# Patient Record
Sex: FEMALE | Race: WHITE | URBAN - METROPOLITAN AREA
[De-identification: names, ages, dates, MRNs, and addresses within clinical notes are randomized per-mention and may not be internally consistent; named-entity substitution may affect disease eponyms.]

---

## 2017-06-28 ENCOUNTER — OFFICE VISIT (OUTPATIENT)
Dept: ORTHOPEDIC SURGERY | Age: 32
End: 2017-06-28

## 2017-06-28 DIAGNOSIS — M54.12 CERVICAL NEURITIS: ICD-10-CM

## 2017-06-28 DIAGNOSIS — M54.2 CERVICALGIA: Primary | ICD-10-CM

## 2017-06-28 DIAGNOSIS — S16.1XXA CERVICAL STRAIN, INITIAL ENCOUNTER: ICD-10-CM

## 2017-06-28 RX ORDER — TRAMADOL HYDROCHLORIDE 50 MG/1
TABLET ORAL
Refills: 0 | COMMUNITY
Start: 2017-06-14

## 2017-06-28 RX ORDER — HYDROCODONE BITARTRATE AND ACETAMINOPHEN 5; 325 MG/1; MG/1
TABLET ORAL
Refills: 0 | COMMUNITY
Start: 2017-06-17

## 2017-06-28 RX ORDER — METHOCARBAMOL 750 MG/1
TABLET, FILM COATED ORAL
Refills: 0 | COMMUNITY
Start: 2017-06-14

## 2017-06-28 RX ORDER — IBUPROFEN 800 MG/1
TABLET ORAL
Qty: 90 TAB | Refills: 0 | Status: SHIPPED | OUTPATIENT
Start: 2017-06-28

## 2017-06-28 RX ORDER — CYCLOBENZAPRINE HCL 10 MG
TABLET ORAL
Refills: 0 | COMMUNITY
Start: 2017-06-17

## 2017-06-28 RX ORDER — BISMUTH SUBSALICYLATE 262 MG
1 TABLET,CHEWABLE ORAL DAILY
COMMUNITY

## 2017-06-28 RX ORDER — LEVOCETIRIZINE DIHYDROCHLORIDE 5 MG/1
TABLET, FILM COATED ORAL
Refills: 0 | COMMUNITY
Start: 2017-04-13

## 2017-06-28 RX ORDER — GABAPENTIN 300 MG/1
300 CAPSULE ORAL
Qty: 90 CAP | Refills: 1 | Status: SHIPPED | OUTPATIENT
Start: 2017-06-28

## 2017-06-28 NOTE — MR AVS SNAPSHOT
Visit Information Date & Time Provider Department Dept. Phone Encounter #  
 6/28/2017  1:20 PM Ysabel Saeed MD [facility] [place] [phone] Follow-up Instructions Return in about 4 weeks (around 7/26/2017). Upcoming Health Maintenance Date Due DTaP/Tdap/Td series (1 - Tdap) 3/25/2006 PAP AKA CERVICAL CYTOLOGY 3/25/2006 INFLUENZA AGE 9 TO ADULT 8/1/2017 Allergies as of 6/28/2017  Review Complete On: 6/28/2017 By: Ysabel Saeed MD  
  
 Severity Noted Reaction Type Reactions Doxycycline High 06/28/2017   Systemic Swelling Effexor [Venlafaxine] Medium 06/28/2017    Other (comments) Burning from the inside out/shaking. Prednisone  06/28/2017    Palpitations \"Adverse reaction\" Current Immunizations  Never Reviewed No immunizations on file. Not reviewed this visit You Were Diagnosed With   
  
 Codes Comments Cervicalgia    -  Primary ICD-10-CM: M54.2 ICD-9-CM: 723.1 Cervical neuritis     ICD-10-CM: M54.12 
ICD-9-CM: 723.4 Cervical strain, initial encounter     ICD-10-CM: S16. Satnam Colace ICD-9-CM: 910. 0 Vitals Smoking Status Current Every Day Smoker Preferred Pharmacy Pharmacy Name Phone [id number] [address] Your Updated Medication List  
  
   
This list is accurate as of: 6/28/17  2:09 PM.  Always use your most recent med list.  
  
  
  
  
 cyclobenzaprine 10 mg tablet Commonly known as:  FLEXERIL  
TAKE 1 TAB BY MOUTH EVERY 8 HOURS AS NEEDED  
  
 gabapentin 300 mg capsule Commonly known as:  NEURONTIN Take 1 Cap by mouth three (3) times daily (with meals). Indications: NEUROPATHIC PAIN  
  
 HYDROcodone-acetaminophen 5-325 mg per tablet Commonly known as:  NORCO  
TAKE 1 TAB BY MOUTH EVERY 4 HOURS  
  
 ibuprofen 800 mg tablet Commonly known as:  MOTRIN  
 TAKE 1 PO TID WITH FOOD PRN  Indications: Pain  
  
 levocetirizine 5 mg tablet Commonly known as:  [place] TAKE 1 TABLET BY MOUTH EVERY DAY AS NEEDED FOR ALLERGIES AND ASTHMA  
  
 methocarbamol 750 mg tablet Commonly known as:  ROBAXIN  
TAKE 1 TABLET BY MOUTH EVERY 6-8 HOURS AS NEEDED  
  
 multivitamin tablet Commonly known as:  ONE A DAY Take 1 Tab by mouth daily. traMADol 50 mg tablet Commonly known as:  ULTRAM  
TAKE 1 TABLET BY MOUTH EVERY EIGHT HOURS ZyrTEC 10 mg Cap Generic drug:  Cetirizine Take  by mouth. Prescriptions Sent to Pharmacy Refills  
 ibuprofen (MOTRIN) 800 mg tablet 0 Sig: TAKE 1 PO TID WITH FOOD PRN  Indications: Pain Class: Normal  
 Pharmacy: [address] Ph #: [phone]  
 gabapentin (NEURONTIN) 300 mg capsule 1 Sig: Take 1 Cap by mouth three (3) times daily (with meals). Indications: NEUROPATHIC PAIN Class: Normal  
 Pharmacy: [facility] [address] Ph #: [phone] Route: Oral  
  
We Performed the Following REFERRAL TO PHYSICAL THERAPY [MIN28 Custom] Comments: EVAL AND TREAT 
CERVICAL SPINE-LUE 
HEP 
2-3 VISIT/WEEK X 2-3 WEEKS 
[facility] Follow-up Instructions Return in about 4 weeks (around 7/26/2017). Referral Information Referral ID Referred By Referred To  
  
 [id number] RAYMOND EDGE III Not Available Visits Status Start Date End Date 1 New Request 6/28/17 6/28/18 If your referral has a status of pending review or denied, additional information will be sent to support the outcome of this decision. Introducing [facility]! Toribio Nyhan introduces [employer] patient portal. Now you can access parts of your medical record, email your doctor's office, and request medication refills online. 1. In your internet browser, go to [email or web address] [email or web address] 2. Click on the First Time User? Click Here link in the Sign In box. You will see the New Member Sign Up page. 3. Enter your [employer] Access Code exactly as it appears below. You will not need to use this code after youve completed the sign-up process. If you do not sign up before the expiration date, you must request a new code. · [employer] Access Code: [email or web address] Expires: 9/26/2017 12:53 PM 
 
4. Enter the last four digits of your Social Security Number (xxxx) and Date of Birth (mm/dd/yyyy) as indicated and click Submit. You will be taken to the next sign-up page. 5. Create a [employer] ID. This will be your [employer] login ID and cannot be changed, so think of one that is secure and easy to remember. 6. Create a [employer] password. You can change your password at any time. 7. Enter your Password Reset Question and Answer. This can be used at a later time if you forget your password. 8. Enter your e-mail address. You will receive e-mail notification when new information is available in [address] 9. Click Sign Up. You can now view and download portions of your medical record. 10. Click the Download Summary menu link to download a portable copy of your medical information. If you have questions, please visit the Frequently Asked Questions section of the [employer] website. Remember, [employer] is NOT to be used for urgent needs. For medical emergencies, dial 911. Now available from your iPhone and Android! Please provide this summary of care documentation to your next provider. Your primary care clinician is listed as Eduardo Fraire. If you have any questions after today's visit, please call [phone].

## 2017-06-28 NOTE — PROGRESS NOTES
[facility]  [address], [place] [address]  Phone: [phone]  Fax: [phone]        INITIAL CONSULTATION      HISTORY OF PRESENT ILLNESS:  Sailaja Moore is a 28 y.o. female whom is referred from Dr. Garry Kim secondary to neck and shoulder blade pain radiating into the LUE extending in roughly a C7/C8 distribution to the hand, involving 3-5 with paraesthesias. She rates pain 6/10. Pt describes pain as a burning sensation and experiences crepitus. Her pain is exacerbated with overhead activity. Note from Dr. Garry Kim dated 6/4/17 indicating patient was seen with c/o neck pain due to strain. Of note, she has been treated with Ibuprofen and Vicodin without relief. At that time, she was treated with Tramadol and Robaxin. ER note dated 6/18/17 indicates patient presented for upper back and neck pain radiating into the UE. They noted she was neurovascularly intact. At that time, she was treated with Norco and Flexeril. Pt denies h/o cervical spinal surgery or injections. She has not attended physical therapy/chiropractor. Pt reports increased heart palpitations with Prednisone. Pt denies dropping things or loss of balance. Pt denies fever, weight loss, or skin changes. She denies possibility of pregnancy secondary to h/o tubal ligation. Pt denies h/o DM, stomach ulcers or bleeding disorders. CT ST Neck dated 3/7/17 per report revealed no evidence of adenopathy or mass. Biapical paraseptal emphysema. Cervical spine XR dated 6/16/17 reviewed. Per report, normal except for mild left C4-5 foraminal stenosis. The patient is RHD.  reviewed which reveals patient has received multiple medications from multiple providers.        Past Medical History:   Diagnosis Date    GERD (gastroesophageal reflux disease)     Psychiatric disorder     anxiety   OPY Past Surgical History:   Procedure Laterality Date    HX PELVIC LAPAROSCOPY  2011    cyst removal    HX PELVIC LAPAROSCOPY  2013    cyst removal    HX PELVIC LAPAROSCOPY  2014    cyst removal    HX TUBAL LIGATION  2015    2013    cyst removal    HX PELVIC LAPAROSCOPY  2014    cyst removal    HX TUBAL LIGATION  2015      Substance Use Topics    Smoking status: Current Every Day Smoker     Packs/day: 1.50     Years: 17.00    Smokeless tobacco: Never Used    Alcohol use No     Work status: Not available. Marital status: The patient is . Allergies   Allergen Reactions    Doxycycline Swelling    Effexor [Venlafaxine] Other (comments)     Burning from the inside out/shaking.  Prednisone Palpitations     \"Adverse reaction\"        Family History   Problem Relation Age of Onset    Lupus Mother     Crohn's Disease Mother     MS Mother     Hypertension Father          REVIEW OF SYSTEMS  Constitutional symptoms: Negative  Eyes: Negative  Ears, Nose, Throat, and Mouth: Negative  Cardiovascular: Negative  Respiratory: Negative  Genitourinary: Negative  Integumentary (Skin and/or breast): Negative  Musculoskeletal: Positive for neck pain into the LUE. Extremities: Negative for edema. Endocrine/Rheumatologic: Negative  Hematologic/Lymphatic: Negative  Allergic/Immunologic: Negative  Psychiatric: Negative       PHYSICAL EXAMINATION  There were no vitals taken for this visit. CONSTITUTIONAL: NAD, A&O x 3  HEART: Regular rate and rhythm  ABDOMEN: Positive bowel sounds, soft, nontender, and nondistended  LUNGS: Clear to auscultation bilaterally. SKIN: No rashes noted. RANGE OF MOTION: The patient has full passive range of motion in all four extremities. SENSATION: Sensation is intact to light touch throughout. MOTOR:   Straight Leg Raise: Negative, bilateral  Scherer: Negative, bilateral  Tandem Gait: Neg. Deep tendon reflexes are 0 at the brachioradialis, biceps, and triceps. Deep tendon reflexes are 2+ at the knees and right ankle, 0 at the left ankle.       Shoulder AB/Flex Elbow Flex Wrist Ext Elbow Ext Wrist Flex Hand Intrin Tone   Right +4/5 +4/5 +4/5 +4/5 +4/5 +4/5 +4/5   Left +4/5 +4/5 +4/5 +4/5 +4/5 +4/5 +4/5              Hip Flex Knee Ext Knee Flex Ankle DF GTE Ankle PF Tone   Right +4/5 +4/5 +4/5 +4/5 +4/5 +4/5 +4/5   Left +4/5 +4/5 +4/5 +4/5 +4/5 +4/5 +4/5         ASSESSMENT   April was seen today for neck pain, back pain and new patient. Diagnoses and all orders for this visit:    Cervicalgia  -     REFERRAL TO PHYSICAL THERAPY  -     ibuprofen (MOTRIN) 800 mg tablet; TAKE 1 PO TID WITH FOOD PRN  Indications: Pain  -     gabapentin (NEURONTIN) 300 mg capsule; Take 1 Cap by mouth three (3) times daily (with meals). Indications: NEUROPATHIC PAIN    Cervical neuritis  -     REFERRAL TO PHYSICAL THERAPY  -     ibuprofen (MOTRIN) 800 mg tablet; TAKE 1 PO TID WITH FOOD PRN  Indications: Pain  -     gabapentin (NEURONTIN) 300 mg capsule; Take 1 Cap by mouth three (3) times daily (with meals). Indications: NEUROPATHIC PAIN    Cervical strain, initial encounter  -     REFERRAL TO PHYSICAL THERAPY  -     ibuprofen (MOTRIN) 800 mg tablet; TAKE 1 PO TID WITH FOOD PRN  Indications: Pain  -     gabapentin (NEURONTIN) 300 mg capsule; Take 1 Cap by mouth three (3) times daily (with meals). Indications: NEUROPATHIC PAIN           IMPRESSIONS/RECOMMENDATIONS:  The patient describes symptoms consistent with C7/C8 radiculopathy. She is neurologically intact. I will try her on Neurontin 300 mg TID. The risks, benefits, and potential side effects of this medication were discussed. Patient understands and wishes to proceed. Patient advised to call the office if intolerant to new medication. I will give her a Rx for Ibuprofen 800 mg TID. I will refer her to physical therapy with an emphasis on HEP. I will see the patient back in 1 month's time or earlier if needed. Written by Marci Boxer, as dictated by Lor Rendon MD  I examined the patient, reviewed and agree with the note.

## 2020-12-11 ENCOUNTER — OFFICE VISIT (OUTPATIENT)
Dept: ORTHOPEDIC SURGERY | Age: 35
End: 2020-12-11

## 2020-12-11 VITALS — HEIGHT: 63 IN | BODY MASS INDEX: 21.26 KG/M2 | WEIGHT: 120 LBS

## 2020-12-11 DIAGNOSIS — M25.521 RIGHT ELBOW PAIN: Primary | ICD-10-CM

## 2020-12-11 PROCEDURE — 99202 OFFICE O/P NEW SF 15 MIN: CPT | Performed by: ORTHOPAEDIC SURGERY

## 2020-12-11 NOTE — PROGRESS NOTES
Name: Fredy Montenegro    : 1985     Service Dept: [address] [place] [address] and Sports Medicine    Patient's Pharmacies:    [facility], [address] [place] [address]  Phone: [phone] Fax: [phone]       Chief Complaint   Patient presents with    Elbow Pain    Wrist Pain        Visit Vitals  Ht 5' 3\" (1.6 m)   Wt 120 lb (54.4 kg)   BMI 21.26 kg/m²      Allergies   Allergen Reactions    Doxycycline Swelling    Effexor [Venlafaxine] Other (comments)     Burning from the inside out/shaking.  Prednisone Palpitations     \"Adverse reaction\"      Current Outpatient Medications   Medication Sig Dispense Refill    traMADol (ULTRAM) 50 mg tablet TAKE 1 TABLET BY MOUTH EVERY EIGHT HOURS  0    methocarbamol (ROBAXIN) 750 mg tablet TAKE 1 TABLET BY MOUTH EVERY 6-8 HOURS AS NEEDED  0    levocetirizine (XYZAL) 5 mg tablet TAKE 1 TABLET BY MOUTH EVERY DAY AS NEEDED FOR ALLERGIES AND ASTHMA  0    HYDROcodone-acetaminophen (NORCO) 5-325 mg per tablet TAKE 1 TAB BY MOUTH EVERY 4 HOURS  0    cyclobenzaprine (FLEXERIL) 10 mg tablet TAKE 1 TAB BY MOUTH EVERY 8 HOURS AS NEEDED  0    Cetirizine (ZYRTEC) 10 mg cap Take  by mouth.  multivitamin (ONE A DAY) tablet Take 1 Tab by mouth daily.  ibuprofen (MOTRIN) 800 mg tablet TAKE 1 PO TID WITH FOOD PRN  Indications: Pain 90 Tab 0    gabapentin (NEURONTIN) 300 mg capsule Take 1 Cap by mouth three (3) times daily (with meals). Indications: NEUROPATHIC PAIN 90 Cap 1      Patient Active Problem List   Diagnosis Code    Cervicalgia M54.2    Cervical neuritis M54.12    Cervical strain S16. 1XXA      Family History   Problem Relation Age of Onset    Lupus Mother     Crohn's Disease Mother     MS Mother     Hypertension Father       Social History     Socioeconomic History    Marital status:      Spouse name: Not on file    Number of children: Not on file    Years of education: Not on file    Highest education level: Not on file   Tobacco Use    Smoking status: Current Every Day Smoker     Packs/day: 1.00     Years: 5.00     Pack years: 5.00    Smokeless tobacco: Never Used   Substance and Sexual Activity    Alcohol use: No    Drug use: No    Sexual activity: Not Currently      Past Surgical History:   Procedure Laterality Date    HX PELVIC LAPAROSCOPY  2011    cyst removal    HX PELVIC LAPAROSCOPY  2013    cyst removal    HX PELVIC LAPAROSCOPY  2014    cyst removal    HX TUBAL LIGATION  2015      Past Medical History:   Diagnosis Date    Asthma     GERD (gastroesophageal reflux disease)     Psychiatric disorder     anxiety        I have reviewed and agree with PFSH and ROS and intake form in chart and the record. Review of Systems:   Patient is a pleasant appearing individual, appropriately dressed, well hydrated, well nourished, who is alert, appropriately oriented for age, and in no acute distress with a normal gait and normal affect who does not appear to be in any significant pain. Physical Exam:  Right Elbow- Grossly neurovascularly intact, Decreased Range of motion, Decreased strenght, patient is able to flex and extend against resistance, no instability, Mild swelling, no crepitation, slight non-specific tenderness to palpation, No skin rashes or lesions identified. Left Elbow- Full Range of motion, No point tenderness, No instability, Normal Strength, No skin lesions, No swelling, Grossly neurovascularly intact. Encounter Diagnoses     ICD-10-CM ICD-9-CM   1. Right elbow pain  M25.521 719.42       HPI:  The patient is here with a chief complaint of right elbow pain, throbbing, burning pain. It has been the same. Nothing has helped. Pain is 9/10. ROS:  10-point review of systems is unremarkable. X-rays of the right elbow are unremarkable, done at outside institution. Assessment/Plan:  1. Right elbow contusion with sprain.     Plan at this point, ice, elevate, and antiinflammatories. I would not recommend any heroic measures. If she is not better in 3 weeks, she is to give us a call. We will see the patient back as needed and go from there. Return to Office: Follow-up and Dispositions    · Return if symptoms worsen or fail to improve. Scribed by Binta Dixon as dictated by [facility] FABRICIO Coyne MD.  Documentation True and Accepted [facility] FABRICIO Coyne MD

## 2020-12-11 NOTE — PATIENT INSTRUCTIONS
Elbow Sprain: Care Instructions Your Care Instructions An elbow sprain occurs when you overstretch or tear the ligaments around your elbow. Ligaments are the tough tissues that connect one bone to another. A sprain can happen when you fall or when you play sports or do chores around the house. Most sprains will heal with some treatment at home. Follow-up care is a key part of your treatment and safety. Be sure to make and go to all appointments, and call your doctor if you are having problems. It's also a good idea to know your test results and keep a list of the medicines you take. How can you care for yourself at home? · Follow your doctor's directions for wearing a splint, elbow pad, sling, or elastic bandage. Wrapping the elbow may help reduce or prevent swelling. · Rest and protect your elbow. Do not do any activity that hurts your elbow. · Apply ice or a cold pack to your elbow for 10 to 20 minutes at a time to reduce swelling. Try this every 1 to 2 hours for 3 days (when you are awake) or until the swelling goes down. Put a thin cloth between the ice and your skin. · After 2 or 3 days, if your swelling is gone, apply a heating pad on low or a warm cloth to your elbow. This helps keep your arm flexible. Some doctors suggest that you go back and forth between hot and cold. Keep the splint dry. · Prop up your elbow on pillows while you apply ice or anytime you sit or lie down. Try to keep the elbow at or above the level of your heart to help reduce swelling. · Take pain medicines exactly as directed. ? If the doctor gave you a prescription medicine for pain, take it as prescribed. ? If you are not taking a prescription pain medicine, ask your doctor if you can take an over-the-counter medicine. · Return to your usual level of activity slowly. When should you call for help? Call your doctor now or seek immediate medical care if: 
  · Your pain is worse.   · You have new or increased swelling in your elbow or hand.  
  · You cannot bend your arm.  
  · You have a fever.  
  · Your elbow looks red.  
  · You have tingling, weakness, or numbness in your elbow, hand, or fingers. Watch closely for changes in your health, and be sure to contact your doctor if: 
  · Your pain is not better after 2 weeks. Where can you learn more? Go to http://www.gray.com/ Enter X333 in the search box to learn more about \"Elbow Sprain: Care Instructions. \" Current as of: March 2, 2020               Content Version: 12.6 © 7379-7611 [employer], [employer]. Care instructions adapted under license by [employer] (which disclaims liability or warranty for this information). If you have questions about a medical condition or this instruction, always ask your healthcare professional. [address] any warranty or liability for your use of this information.